# Patient Record
Sex: FEMALE | Race: WHITE | Employment: OTHER | URBAN - METROPOLITAN AREA
[De-identification: names, ages, dates, MRNs, and addresses within clinical notes are randomized per-mention and may not be internally consistent; named-entity substitution may affect disease eponyms.]

---

## 2019-01-02 PROBLEM — Z85.42 H/O MALIGNANT NEOPLASM OF ENDOMETRIUM: Status: ACTIVE | Noted: 2019-01-02

## 2019-01-03 ENCOUNTER — HOSPITAL ENCOUNTER (OUTPATIENT)
Dept: GENERAL RADIOLOGY | Age: 74
Discharge: HOME | End: 2019-01-05
Payer: MEDICARE

## 2019-01-03 DIAGNOSIS — Z85.3 PERSONAL HISTORY OF MALIGNANT NEOPLASM OF BREAST: ICD-10-CM

## 2019-01-03 DIAGNOSIS — Z12.31 SCREENING MAMMOGRAM, ENCOUNTER FOR: ICD-10-CM

## 2019-01-03 DIAGNOSIS — Z01.419 WELL FEMALE EXAM WITH ROUTINE GYNECOLOGICAL EXAM: ICD-10-CM

## 2019-01-03 DIAGNOSIS — Z12.11 SCREEN FOR COLON CANCER: ICD-10-CM

## 2019-01-03 PROCEDURE — 77067 SCR MAMMO BI INCL CAD: CPT

## 2019-01-10 ENCOUNTER — TELEPHONE (OUTPATIENT)
Dept: ONCOLOGY | Age: 74
End: 2019-01-10

## 2019-01-27 PROBLEM — Z85.3 PERSONAL HISTORY OF MALIGNANT NEOPLASM OF BREAST: Status: ACTIVE | Noted: 2019-01-27

## 2019-08-21 ENCOUNTER — HOSPITAL ENCOUNTER (EMERGENCY)
Age: 74
Discharge: HOME | End: 2019-08-21
Payer: MEDICARE

## 2019-08-21 ENCOUNTER — APPOINTMENT (OUTPATIENT)
Dept: CT IMAGING | Age: 74
End: 2019-08-21
Payer: MEDICARE

## 2019-08-21 VITALS
RESPIRATION RATE: 16 BRPM | WEIGHT: 105 LBS | HEART RATE: 81 BPM | DIASTOLIC BLOOD PRESSURE: 83 MMHG | SYSTOLIC BLOOD PRESSURE: 176 MMHG | TEMPERATURE: 98.4 F | OXYGEN SATURATION: 99 % | BODY MASS INDEX: 22.04 KG/M2 | HEIGHT: 58 IN

## 2019-08-21 DIAGNOSIS — S16.1XXA ACUTE STRAIN OF NECK MUSCLE, INITIAL ENCOUNTER: ICD-10-CM

## 2019-08-21 DIAGNOSIS — S01.01XA LACERATION OF SCALP, INITIAL ENCOUNTER: ICD-10-CM

## 2019-08-21 DIAGNOSIS — S00.03XA HEMATOMA OF SCALP, INITIAL ENCOUNTER: ICD-10-CM

## 2019-08-21 DIAGNOSIS — S09.90XA CLOSED HEAD INJURY, INITIAL ENCOUNTER: Primary | ICD-10-CM
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
## 2019-08-21 PROCEDURE — 99283 EMERGENCY DEPT VISIT LOW MDM: CPT

## 2019-08-21 PROCEDURE — 72125 CT NECK SPINE W/O DYE: CPT

## 2019-08-21 PROCEDURE — 70450 CT HEAD/BRAIN W/O DYE: CPT

## 2019-08-21 RX ORDER — GINSENG 100 MG
CAPSULE ORAL ONCE
Status: DISCONTINUED | OUTPATIENT
Start: 2019-08-21 | End: 2019-08-21 | Stop reason: HOSPADM

## 2019-08-21 ASSESSMENT — PAIN SCALES - GENERAL: PAINLEVEL_OUTOF10: 8

## 2019-08-23 ENCOUNTER — CARE COORDINATION (OUTPATIENT)
Dept: CARE COORDINATION | Age: 74
End: 2019-08-23

## 2020-01-02 ENCOUNTER — OFFICE VISIT (OUTPATIENT)
Dept: NEUROLOGY | Age: 75
End: 2020-01-02
Payer: MEDICARE

## 2020-01-02 VITALS
DIASTOLIC BLOOD PRESSURE: 65 MMHG | TEMPERATURE: 97 F | HEIGHT: 58 IN | BODY MASS INDEX: 23.3 KG/M2 | SYSTOLIC BLOOD PRESSURE: 113 MMHG | HEART RATE: 67 BPM | WEIGHT: 111 LBS | RESPIRATION RATE: 18 BRPM

## 2020-01-02 PROCEDURE — 99204 OFFICE O/P NEW MOD 45 MIN: CPT | Performed by: PSYCHIATRY & NEUROLOGY

## 2020-01-02 PROCEDURE — G8420 CALC BMI NORM PARAMETERS: HCPCS | Performed by: PSYCHIATRY & NEUROLOGY

## 2020-01-02 PROCEDURE — 2022F DILAT RTA XM EVC RTNOPTHY: CPT | Performed by: PSYCHIATRY & NEUROLOGY

## 2020-01-02 PROCEDURE — G8484 FLU IMMUNIZE NO ADMIN: HCPCS | Performed by: PSYCHIATRY & NEUROLOGY

## 2020-01-02 PROCEDURE — G8427 DOCREV CUR MEDS BY ELIG CLIN: HCPCS | Performed by: PSYCHIATRY & NEUROLOGY

## 2020-01-02 PROCEDURE — 1090F PRES/ABSN URINE INCON ASSESS: CPT | Performed by: PSYCHIATRY & NEUROLOGY

## 2020-01-02 ASSESSMENT — ENCOUNTER SYMPTOMS
TROUBLE SWALLOWING: 0
SHORTNESS OF BREATH: 0
NAUSEA: 0
PHOTOPHOBIA: 0
VOMITING: 0

## 2020-01-02 NOTE — PROGRESS NOTES
the left internal capsule. CT cervical spine: Diffuse degenerative changes from C3-T1    Carotid Doppler: 2018: Marked tortuosity of the left proximal ICA    I have personally reviewed the lab results:    Vitamin D: 36  WBC: 7  RBC: 3.8    LDL: 87    I have personally reviewed the outside records.     PAST MEDICAL HISTORY:   Past Medical History:   Diagnosis Date    Anxiety     Asthma     Heart murmur     Hypertension     Meniere's disease     Rheumatic fever     Vasovagal near syncope 2012    Vitamin D deficiency      PAST SURGICAL HISTORY:   Past Surgical History:   Procedure Laterality Date    APPENDECTOMY      BREAST BIOPSY      2 bx negative    CATARACT REMOVAL      rt 5/5/10 lt6/9/10    HYSTERECTOMY  2001    emmanuel and bso    TONSILLECTOMY       FAMILY MEDICAL HISTORY:   Family History   Problem Relation Age of Onset    Breast Cancer Mother     Stroke Sister      SOCIAL HISTORY:   Social History     Socioeconomic History    Marital status:      Spouse name: None    Number of children: None    Years of education: None    Highest education level: None   Occupational History    None   Social Needs    Financial resource strain: None    Food insecurity:     Worry: None     Inability: None    Transportation needs:     Medical: None     Non-medical: None   Tobacco Use    Smoking status: Former Smoker     Packs/day: 1.00     Years: 5.00     Pack years: 5.00     Last attempt to quit: 1962     Years since quittin.0    Smokeless tobacco: Never Used   Substance and Sexual Activity    Alcohol use: No    Drug use: No    Sexual activity: Yes     Partners: Male   Lifestyle    Physical activity:     Days per week: None     Minutes per session: None    Stress: None   Relationships    Social connections:     Talks on phone: None     Gets together: None     Attends Yarsanism service: None     Active member of club or organization: None     Attends meetings of clubs or organizations: None     Relationship status: None    Intimate partner violence:     Fear of current or ex partner: None     Emotionally abused: None     Physically abused: None     Forced sexual activity: None   Other Topics Concern    None   Social History Narrative    None     Allergy:   Allergies   Allergen Reactions    Dye [Iodides] Hives    Molds & Smuts Other (See Comments)     coughing    Naprosyn [Naproxen] Other (See Comments)     diplopia  Stomach reaction    Nsaids     Salicylates     Seasonale [Levonorgest-Eth Estrad 91-Day] Other (See Comments)     sneszing    Shellfish-Derived Products Hives     MEDS:   Current Outpatient Medications:     sertraline (ZOLOFT) 50 MG tablet, Take 1 tablet by mouth daily, Disp: 30 tablet, Rfl: 11    dicyclomine (BENTYL) 20 MG tablet, Take 1 tablet by mouth 4 times daily as needed (spasm), Disp: 60 tablet, Rfl: 5    meclizine (ANTIVERT) 25 MG tablet, Take 1 tablet by mouth 3 times daily as needed for Dizziness, Disp: 90 tablet, Rfl: 2    simvastatin (ZOCOR) 20 MG tablet, TAKE ONE TABLET BY MOUTH DAILY, Disp: 60 tablet, Rfl: 1    olmesartan (BENICAR) 5 MG tablet, Take 2 tablets by mouth daily, Disp: 180 tablet, Rfl: 3    REVIEW OF SYSTEMS  Review of Systems   Constitutional: Negative for appetite change, fatigue and unexpected weight change. HENT: Negative for drooling, hearing loss, tinnitus and trouble swallowing. Eyes: Negative for photophobia and visual disturbance. Respiratory: Negative for shortness of breath. Cardiovascular: Negative for palpitations. Gastrointestinal: Negative for nausea and vomiting. Endocrine: Negative for polyuria. Genitourinary: Negative for flank pain. Musculoskeletal: Positive for gait problem, neck pain and neck stiffness. Skin: Negative for rash. Allergic/Immunologic: Negative for food allergies.    Neurological: Negative for dizziness, tremors, seizures, syncope, speech difficulty, weakness, light-headedness, numbness and headaches. Hematological: Negative for adenopathy. Psychiatric/Behavioral: Negative for agitation, behavioral problems and sleep disturbance. PHYSICAL EXAM:   /65 (Site: Left Upper Arm, Position: Sitting, Cuff Size: Medium Adult)   Pulse 67   Temp 97 °F (36.1 °C) (Oral)   Resp 18   Ht 4' 10\" (1.473 m)   Wt 111 lb (50.3 kg)   BMI 23.20 kg/m²   GENERAL APPEARANCE: Alert, well-developed, well-nourished female in no acute distress. HEENT: Normocephalic and atraumatic. PERRL. Oropharynx unremarkable. PULM: Normal respiratory effort. No accessory muscle use. CV: RRR. ABDOMEN: Soft, nontender. EXTREMITIES: No obvious signs of vascular compromise. Pulses present. No cyanosis, clubbing or edema. SKIN: Clear; no rashes, lesions or skin breaks in exposed areas. NEURO:     Neurological examination     MENTAL STATUS: Patient awake and oriented to time, place, and person. Recent/remote memory normal. Attention span/concentration normal. Speech fluent. Good comprehension, naming, and repetition. Fund of knowledge appropriate for patient's level of education. Affect normal.    CRANIAL NERVES:  CN I: Not tested. CN II: Fundoscopic exam not performed. CN III, IV, VI: Pupils equal, round and reactive to light; extra ocular movements full and intact. CN V: Facial sensation normal.  CN VII: No facial asymmetry. CN VIII:  Hearing grossly normal bilaterally. No pathologic nystagmus or skew deviation. CN IX, X: Palate elevates symmetrically. CN XI: Shoulder shrug and chin rotation equal with intact strength. CN XII: Tongue protrusion midline. MOTOR: Normal bulk. Tone normal and symmetric throughout. Strength 5/5 throughout. ABNORMAL MOVEMENTS/TREMORS: No     REFLEXES: DTRs 3+; normal and symmetric throughout. Plantar response downgoing. SENSATION: Sensation grossly intact to fine touch, pain/temperature, vibration and position.     COORDINATION: Finger-to-nose and heel to shin normal for age and symmetric. Finger tapping and alternating movements normal.    STATION: Negative Romberg. GAIT:  Normal heel, toe and tandem; no ataxia. DIAGNOSTIC TESTS:   Sodium   Date Value Ref Range Status   11/13/2018 134 mmol/L Final   04/24/2018 139 mmol/L Final   10/18/2017 135 mmol/L Final     Potassium   Date Value Ref Range Status   04/24/2018 4.8 mmol/L Final   09/21/2017 4.7 3.5 - 5.0 mmol/L Final   09/20/2017 4.9 3.5 - 5.0 mmol/L Final     Chloride   Date Value Ref Range Status   09/21/2017 94 (L) 98 - 107 mmol/L Final   09/20/2017 91 (L) 98 - 107 mmol/L Final   05/01/2017 97 (L) 98 - 107 mmol/L Final     CO2   Date Value Ref Range Status   09/21/2017 27 22 - 29 mmol/L Final   09/20/2017 28 22 - 29 mmol/L Final   05/01/2017 25 22 - 29 mmol/L Final     BUN   Date Value Ref Range Status   04/24/2018 20 mg/dL Final   09/21/2017 24 (H) 8 - 23 mg/dL Final   09/20/2017 24 (H) 8 - 23 mg/dL Final     CREATININE   Date Value Ref Range Status   04/24/2018 1.10  Final   09/21/2017 1.0 0.5 - 1.0 mg/dL Final   09/20/2017 1.0 0.5 - 1.0 mg/dL Final     GFR Non-   Date Value Ref Range Status   09/21/2017 54 >=60 mL/min/1.73 Final     Comment:     Chronic Kidney Disease: less than 60 ml/min/1.73 sq.m. Kidney Failure: less than 15 ml/min/1.73 sq.m. Results valid for patients 18 years and older. 09/20/2017 54 >=60 mL/min/1.73 Final     Comment:     Chronic Kidney Disease: less than 60 ml/min/1.73 sq.m. Kidney Failure: less than 15 ml/min/1.73 sq.m. Results valid for patients 18 years and older. 05/01/2017 54 >=60 mL/min/1.73 Final     Comment:     Chronic Kidney Disease: less than 60 ml/min/1.73 sq.m. Kidney Failure: less than 15 ml/min/1.73 sq.m. Results valid for patients 18 years and older.        Gfr Calculated   Date Value Ref Range Status   05/14/2019 51.43  Final   04/24/2018 51.58  Final     Calcium   Date Value Ref Range Status   09/21/2017 9.3 8.6 - 10.2 mg/dL Final   09/20/2017 10.3 (H) 8.6 - 10.2 mg/dL Final   05/01/2017 9.3 8.6 - 10.2 mg/dL Final     Magnesium   Date Value Ref Range Status   09/21/2017 1.9 1.6 - 2.6 mg/dL Final     WBC   Date Value Ref Range Status   09/21/2017 6.4 4.5 - 11.5 E9/L Final   09/20/2017 6.1 4.5 - 11.5 E9/L Final   05/01/2017 8.3 4.5 - 11.5 E9/L Final     Hemoglobin   Date Value Ref Range Status   09/21/2017 12.6 11.5 - 15.5 g/dL Final   09/20/2017 13.7 11.5 - 15.5 g/dL Final   05/01/2017 12.9 11.5 - 15.5 g/dL Final     Hematocrit   Date Value Ref Range Status   09/21/2017 37.6 34.0 - 48.0 % Final   09/20/2017 40.3 34.0 - 48.0 % Final   05/01/2017 37.5 34.0 - 48.0 % Final     Platelets   Date Value Ref Range Status   09/21/2017 236 130 - 450 E9/L Final   09/20/2017 251 130 - 450 E9/L Final   05/01/2017 236 130 - 450 E9/L Final     Neutrophils %   Date Value Ref Range Status   09/21/2017 51.2 43.0 - 80.0 % Final   05/01/2017 76.0 43.0 - 80.0 % Final     Monocytes %   Date Value Ref Range Status   09/21/2017 13.1 (H) 2.0 - 12.0 % Final   05/01/2017 7.2 2.0 - 12.0 % Final     Monocytes   Date Value Ref Range Status   03/23/2011 11 2 - 12 % Final     Total Protein   Date Value Ref Range Status   01/19/2015 6.9 6.4 - 8.3 g/dL Final   12/16/2014 7.1 6.4 - 8.3 g/dL Final   03/23/2011 6.8 5.7 - 8.2 g/dL Final     Total Bilirubin   Date Value Ref Range Status   04/24/2018 0.5 0.1 - 1.4 mg/dL Final   01/19/2015 0.4 0.0 - 1.2 mg/dL Final   12/16/2014 0.3 0.0 - 1.2 mg/dL Final     Alkaline Phosphatase   Date Value Ref Range Status   05/14/2019 148 U/L Final   11/13/2018 149 U/L Final   04/24/2018 129 U/L Final     ALT   Date Value Ref Range Status   04/24/2018 21 U/L Final   01/19/2015 22 0 - 32 U/L Final   12/16/2014 23 0 - 32 U/L Final     AST   Date Value Ref Range Status   04/24/2018 25 U/L Final   01/19/2015 30 0 - 31 U/L Final   12/16/2014 28 0 - 31 U/L Final     Lab Results   Component Value Date

## 2020-01-03 ENCOUNTER — TELEPHONE (OUTPATIENT)
Dept: NEUROLOGY | Age: 75
End: 2020-01-03

## 2020-01-03 ENCOUNTER — HOSPITAL ENCOUNTER (OUTPATIENT)
Age: 75
Discharge: HOME | End: 2020-01-03
Payer: MEDICARE

## 2020-01-03 LAB — HBA1C MFR BLD: 5.9 % (ref 4–5.6)

## 2020-01-03 PROCEDURE — 36415 COLL VENOUS BLD VENIPUNCTURE: CPT

## 2020-01-03 PROCEDURE — 83036 HEMOGLOBIN GLYCOSYLATED A1C: CPT

## 2020-01-07 ENCOUNTER — HOSPITAL ENCOUNTER (OUTPATIENT)
Dept: MRI IMAGING | Age: 75
Discharge: HOME | End: 2020-01-09
Payer: MEDICARE

## 2020-01-07 ENCOUNTER — TELEPHONE (OUTPATIENT)
Dept: NEUROLOGY | Age: 75
End: 2020-01-07

## 2020-01-07 PROCEDURE — 70551 MRI BRAIN STEM W/O DYE: CPT

## 2020-01-07 PROCEDURE — 70544 MR ANGIOGRAPHY HEAD W/O DYE: CPT

## 2020-01-07 PROCEDURE — 72141 MRI NECK SPINE W/O DYE: CPT

## 2020-01-07 NOTE — TELEPHONE ENCOUNTER
Notified pt that her A1C was 5.9. This can be controlled with diet and exercise. She was instructed to follow up with her PCP. Pt verbalized understanding.

## 2020-01-07 NOTE — TELEPHONE ENCOUNTER
----- Message from Tiffanie Gan MD sent at 1/6/2020  4:40 PM EST -----  Hemoglobin A1c: 5.9. Controlled with diet and exercise  Follow-up with primary care physician.

## 2020-01-08 ENCOUNTER — TELEPHONE (OUTPATIENT)
Dept: NEUROLOGY | Age: 75
End: 2020-01-08

## 2020-01-08 NOTE — TELEPHONE ENCOUNTER
Patient notified :    MRI cervical spine: She has arthritis and degenerative disc at multiple levels  Most notable at     Central spinal canal stenosis is present at C5-6. Neural foraminal canal stenosis is present on the left C2-3,  bilaterally at C3-4, C4-5, C5-6 and C6-7. She will benefit from physical therapy if not already done. Please inquire. Patient states is going to vestibular therapy at [facility] and would like to go to Physical therapy.

## 2020-01-08 NOTE — TELEPHONE ENCOUNTER
----- Message from Raya Govea MD sent at 1/8/2020  1:49 PM EST -----  Please inform the patient. MRI cervical spine: She has arthritis and degenerative disc at multiple levels  Most notable at     Central spinal canal stenosis is present at C5-6. Neural foraminal canal stenosis is present on the left C2-3,  bilaterally at C3-4, C4-5, C5-6 and C6-7. She will benefit from physical therapy if not already done.   Please inquire

## 2020-01-14 ENCOUNTER — HOSPITAL ENCOUNTER (OUTPATIENT)
Dept: GENERAL RADIOLOGY | Age: 75
Discharge: HOME | End: 2020-01-16
Payer: MEDICARE

## 2020-01-14 ENCOUNTER — APPOINTMENT (OUTPATIENT)
Dept: GENERAL RADIOLOGY | Age: 75
End: 2020-01-14
Payer: MEDICARE

## 2020-01-14 PROCEDURE — G0279 TOMOSYNTHESIS, MAMMO: HCPCS

## 2020-01-14 PROCEDURE — 76642 ULTRASOUND BREAST LIMITED: CPT

## 2020-01-14 PROCEDURE — 77063 BREAST TOMOSYNTHESIS BI: CPT

## 2020-01-30 ENCOUNTER — TELEPHONE (OUTPATIENT)
Dept: NEUROLOGY | Age: 75
End: 2020-01-30

## 2020-01-30 ENCOUNTER — OFFICE VISIT (OUTPATIENT)
Dept: NEUROLOGY | Age: 75
End: 2020-01-30
Payer: MEDICARE

## 2020-01-30 VITALS
BODY MASS INDEX: 23.51 KG/M2 | DIASTOLIC BLOOD PRESSURE: 55 MMHG | HEART RATE: 68 BPM | HEIGHT: 57 IN | SYSTOLIC BLOOD PRESSURE: 101 MMHG | TEMPERATURE: 97.2 F | WEIGHT: 109 LBS | OXYGEN SATURATION: 99 % | RESPIRATION RATE: 18 BRPM

## 2020-01-30 PROCEDURE — 1036F TOBACCO NON-USER: CPT | Performed by: PSYCHIATRY & NEUROLOGY

## 2020-01-30 PROCEDURE — 1123F ACP DISCUSS/DSCN MKR DOCD: CPT | Performed by: PSYCHIATRY & NEUROLOGY

## 2020-01-30 PROCEDURE — G8484 FLU IMMUNIZE NO ADMIN: HCPCS | Performed by: PSYCHIATRY & NEUROLOGY

## 2020-01-30 PROCEDURE — G8420 CALC BMI NORM PARAMETERS: HCPCS | Performed by: PSYCHIATRY & NEUROLOGY

## 2020-01-30 PROCEDURE — 99215 OFFICE O/P EST HI 40 MIN: CPT | Performed by: PSYCHIATRY & NEUROLOGY

## 2020-01-30 PROCEDURE — 3017F COLORECTAL CA SCREEN DOC REV: CPT | Performed by: PSYCHIATRY & NEUROLOGY

## 2020-01-30 PROCEDURE — G8427 DOCREV CUR MEDS BY ELIG CLIN: HCPCS | Performed by: PSYCHIATRY & NEUROLOGY

## 2020-01-30 PROCEDURE — 1090F PRES/ABSN URINE INCON ASSESS: CPT | Performed by: PSYCHIATRY & NEUROLOGY

## 2020-01-30 PROCEDURE — 4040F PNEUMOC VAC/ADMIN/RCVD: CPT | Performed by: PSYCHIATRY & NEUROLOGY

## 2020-01-30 PROCEDURE — G8399 PT W/DXA RESULTS DOCUMENT: HCPCS | Performed by: PSYCHIATRY & NEUROLOGY

## 2020-01-30 ASSESSMENT — ENCOUNTER SYMPTOMS
TROUBLE SWALLOWING: 0
VOMITING: 0
SHORTNESS OF BREATH: 0
PHOTOPHOBIA: 0
NAUSEA: 0

## 2020-01-30 NOTE — TELEPHONE ENCOUNTER
Left message on patients answering machine that on today's visit Dr Sofya Lozoya  reviewed and discussed the MRI brain and cervical spine results in detail with the patient. She has stenosis of the cervical spine which could be attributing to her dizziness along with the Ménière's. The brain MRI showed an  old stroke and white spots.   The white spots indicate damage to the brain from underlying high blood pressure, smoking etc. which can cause memory loss in long-term

## 2020-01-30 NOTE — PROGRESS NOTES
NEUROLOGY FOLLOW UP CONSULTATION NOTE     Date: 1/30/2020  Name: Suzanne Chacon  MRN: [id number]  Patient's PCP: Migue Benitez MD     Dear, Dr. Migue Benitez MD    REASON FOR VISIT: Follow-up for disequilibrium, dizziness and lacunar infarct. Interim history:    The patient is coming in for follow-up visit. She reports that her dizziness has significantly improved after she started doing physical therapy. Currently she is doing vestibular therapy as well. However she does report that she feels that the dizziness is starting to come on since today morning. MRI brain: Significant small vessel ischemic disease with old left basal ganglia infarct  MRA head and neck: No acute abnormality  MRI cervical spine::Central spinal canal stenosis is present at C5-6. Neural foraminal canal stenosis is present on the left C2-3,  bilaterally at C3-4, C4-5, C5-6 and C6-7. Dizziness gets worse whenever she moves her legs. No other aggravating relieving factors  No other associated symptoms    I have personally reviewed the images of MRI brain and cervical spine. I have personally reviewed the labs    Hemoglobin A1c: 5.9  LDL: 87  Vitamin D level: 36    Disease course:    Suzanne Chacon is a 75 y.o.  female with a past medical history of hypertension, mitral valve prolapse, degenerative cervical disc disease, and Ménière's disease currently on a low-salt diet. Dizziness    · Describes as: Unsteadiness, unsteady on her feet, feels like she will fall onto the ground. · Onset: 1 year ago  · Duration: 10 to 15 minutes  · Severity: Mild to moderate  · Pattern : Intermittent   · Context:  When eating breakfast after which she feels as if she will collapse to the ground, occurs intermittently. Resolve on its   own. She had an episode while driving about 7 to 8 months ago where she   was   drifting from one osiris to the other and then suddenly realized that she was doing that.   · Aggravating factors: None  · use: No    Drug use: No    Sexual activity: Yes     Partners: Male   Lifestyle    Physical activity:     Days per week: None     Minutes per session: None    Stress: None   Relationships    Social connections:     Talks on phone: None     Gets together: None     Attends Uatsdin service: None     Active member of club or organization: None     Attends meetings of clubs or organizations: None     Relationship status: None    Intimate partner violence:     Fear of current or ex partner: None     Emotionally abused: None     Physically abused: None     Forced sexual activity: None   Other Topics Concern    None   Social History Narrative    None     Allergy:   Allergies   Allergen Reactions    Dye [Iodides] Hives    Molds & Smuts Other (See Comments)     coughing    Naprosyn [Naproxen] Other (See Comments)     diplopia  Stomach reaction    Nsaids     Salicylates     Seasonale [Levonorgest-Eth Estrad 91-Day] Other (See Comments)     sneszing    Shellfish-Derived Products Hives     MEDS:   Current Outpatient Medications:     olmesartan (BENICAR) 5 MG tablet, TAKE 2 TABLETS BY MOUTH DAILY, Disp: 180 tablet, Rfl: 3    sertraline (ZOLOFT) 50 MG tablet, Take 1 tablet by mouth daily, Disp: 30 tablet, Rfl: 11    dicyclomine (BENTYL) 20 MG tablet, Take 1 tablet by mouth 4 times daily as needed (spasm) (Patient taking differently: Take 20 mg by mouth 1/2 daily), Disp: 60 tablet, Rfl: 5    meclizine (ANTIVERT) 25 MG tablet, Take 1 tablet by mouth 3 times daily as needed for Dizziness (Patient taking differently: Take 25 mg by mouth 1 tablet twice daily), Disp: 90 tablet, Rfl: 2    simvastatin (ZOCOR) 20 MG tablet, TAKE ONE TABLET BY MOUTH DAILY, Disp: 60 tablet, Rfl: 1    REVIEW OF SYSTEMS  Review of Systems   Constitutional: Negative for appetite change, fatigue and unexpected weight change. HENT: Negative for drooling, hearing loss, tinnitus and trouble swallowing.     Eyes: Negative for photophobia and grossly normal bilaterally. No pathologic nystagmus or skew deviation. CN IX, X: Palate elevates symmetrically. CN XI: Shoulder shrug and chin rotation equal with intact strength. CN XII: Tongue protrusion midline. MOTOR: Normal bulk. Tone normal and symmetric throughout. Strength 5/5 throughout. ABNORMAL MOVEMENTS/TREMORS: No     REFLEXES: DTRs 3+; normal and symmetric throughout. Plantar response downgoing. SENSATION: Sensation grossly intact to fine touch, pain/temperature, vibration and position. COORDINATION: Finger-to-nose and heel to shin normal for age and symmetric. Finger tapping and alternating movements normal.    STATION: Negative Romberg. GAIT:  Normal heel, toe and tandem; no ataxia. DIAGNOSTIC TESTS:   Sodium   Date Value Ref Range Status   11/13/2018 134 mmol/L Final   04/24/2018 139 mmol/L Final   10/18/2017 135 mmol/L Final     Potassium   Date Value Ref Range Status   04/24/2018 4.8 mmol/L Final   09/21/2017 4.7 3.5 - 5.0 mmol/L Final   09/20/2017 4.9 3.5 - 5.0 mmol/L Final     Chloride   Date Value Ref Range Status   09/21/2017 94 (L) 98 - 107 mmol/L Final   09/20/2017 91 (L) 98 - 107 mmol/L Final   05/01/2017 97 (L) 98 - 107 mmol/L Final     CO2   Date Value Ref Range Status   09/21/2017 27 22 - 29 mmol/L Final   09/20/2017 28 22 - 29 mmol/L Final   05/01/2017 25 22 - 29 mmol/L Final     BUN   Date Value Ref Range Status   04/24/2018 20 mg/dL Final   09/21/2017 24 (H) 8 - 23 mg/dL Final   09/20/2017 24 (H) 8 - 23 mg/dL Final     CREATININE   Date Value Ref Range Status   04/24/2018 1.10  Final   09/21/2017 1.0 0.5 - 1.0 mg/dL Final   09/20/2017 1.0 0.5 - 1.0 mg/dL Final     GFR Non-   Date Value Ref Range Status   09/21/2017 54 >=60 mL/min/1.73 Final     Comment:     Chronic Kidney Disease: less than 60 ml/min/1.73 sq.m. Kidney Failure: less than 15 ml/min/1.73 sq.m. Results valid for patients 18 years and older.      09/20/2017 54 >=60 mL/min/1.73 Final     Comment:     Chronic Kidney Disease: less than 60 ml/min/1.73 sq.m. Kidney Failure: less than 15 ml/min/1.73 sq.m. Results valid for patients 18 years and older. 05/01/2017 54 >=60 mL/min/1.73 Final     Comment:     Chronic Kidney Disease: less than 60 ml/min/1.73 sq.m. Kidney Failure: less than 15 ml/min/1.73 sq.m. Results valid for patients 18 years and older.        Gfr Calculated   Date Value Ref Range Status   05/14/2019 51.43  Final   04/24/2018 51.58  Final     Calcium   Date Value Ref Range Status   09/21/2017 9.3 8.6 - 10.2 mg/dL Final   09/20/2017 10.3 (H) 8.6 - 10.2 mg/dL Final   05/01/2017 9.3 8.6 - 10.2 mg/dL Final     Magnesium   Date Value Ref Range Status   09/21/2017 1.9 1.6 - 2.6 mg/dL Final     WBC   Date Value Ref Range Status   09/21/2017 6.4 4.5 - 11.5 E9/L Final   09/20/2017 6.1 4.5 - 11.5 E9/L Final   05/01/2017 8.3 4.5 - 11.5 E9/L Final     Hemoglobin   Date Value Ref Range Status   09/21/2017 12.6 11.5 - 15.5 g/dL Final   09/20/2017 13.7 11.5 - 15.5 g/dL Final   05/01/2017 12.9 11.5 - 15.5 g/dL Final     Hematocrit   Date Value Ref Range Status   09/21/2017 37.6 34.0 - 48.0 % Final   09/20/2017 40.3 34.0 - 48.0 % Final   05/01/2017 37.5 34.0 - 48.0 % Final     Platelets   Date Value Ref Range Status   09/21/2017 236 130 - 450 E9/L Final   09/20/2017 251 130 - 450 E9/L Final   05/01/2017 236 130 - 450 E9/L Final     Neutrophils %   Date Value Ref Range Status   09/21/2017 51.2 43.0 - 80.0 % Final   05/01/2017 76.0 43.0 - 80.0 % Final     Monocytes %   Date Value Ref Range Status   09/21/2017 13.1 (H) 2.0 - 12.0 % Final   05/01/2017 7.2 2.0 - 12.0 % Final     Monocytes   Date Value Ref Range Status   03/23/2011 11 2 - 12 % Final     Total Protein   Date Value Ref Range Status   01/19/2015 6.9 6.4 - 8.3 g/dL Final   12/16/2014 7.1 6.4 - 8.3 g/dL Final   03/23/2011 6.8 5.7 - 8.2 g/dL Final     Total Bilirubin   Date Value Ref Range Status 04/24/2018 0.5 0.1 - 1.4 mg/dL Final   01/19/2015 0.4 0.0 - 1.2 mg/dL Final   12/16/2014 0.3 0.0 - 1.2 mg/dL Final     Alkaline Phosphatase   Date Value Ref Range Status   05/14/2019 148 U/L Final   11/13/2018 149 U/L Final   04/24/2018 129 U/L Final     ALT   Date Value Ref Range Status   04/24/2018 21 U/L Final   01/19/2015 22 0 - 32 U/L Final   12/16/2014 23 0 - 32 U/L Final     AST   Date Value Ref Range Status   04/24/2018 25 U/L Final   01/19/2015 30 0 - 31 U/L Final   12/16/2014 28 0 - 31 U/L Final     Lab Results   Component Value Date    INR 1.1 09/23/2012     Lab Results   Component Value Date    TRIG 58 05/14/2019    HDL 72 (A) 05/14/2019     No components found for: HGBA1C  No results found for: PROTEINCSF, GLUCCSF, WBCCSF    Controlled Substance Monitoring:    Acute and Chronic Pain Monitoring:   No flowsheet data found. MEDICAL DECISION MAKING  ASSESSMENT/PLAN    Kira Miller was seen today for dizziness. Diagnoses and all orders for this visit:    Dizziness  Most likely secondary to a combination of Ménière's disease and cervical spinal stenosis and cervical biomechanics. Currently she is on meclizine which helps her symptoms partially. Continue with physical therapy for cervicogenic dizziness  She will benefit from gabapentin and occipital nerve blocks for cervicogenic dizziness. I have discussed the options with the patient however the patient would like to think about this and get back to me. Cervical spinal stenosis  The patient is currently doing physical therapy  I have discussed the possibility of seeing a neurosurgeon however she does not want to see a neurosurgeon at this time. Hyperreflexia   Most likely secondary from underlying cervical spinal stenosis. Ménière's disease  She does have associated dizziness with the Ménière's disease. Currently on a low-salt diet  She follows up at [facility].     Left basal ganglia stroke    MRI brain: Significant small vessel disease in left basal ganglia lacunar stroke  Etiology of the stroke: Small vessel disease  LDL: 87, continue on simvastatin  Hemoglobin A1c: 5.9, lifestyle modification  Start aspirin 81 mg daily      Return if symptoms worsen or fail to improve. Today, I personally spent a great deal of time directly face-to-face with the patient, of which greater than 50% was spent in patient education, counseling,about etiology management diagnosis of cervical spinal stenosis, conditions that can cause dizziness, Ménière's disease, vertebrobasilar syndrome, cerebellar stroke, stroke risk factors and coordination of care as described above. Patient's current medication list, allergies, problem list and results of all previously ordered testing  were reviewed at today's visit.       MD SERA Sarmiento [facility] Neurology  [address] [place] [address]

## 2020-01-30 NOTE — TELEPHONE ENCOUNTER
On today's visit I had reviewed and discussed the MRI brain and cervical spine results in detail with the patient. She has stenosis of the cervical spine which could be attributing to her dizziness along with the Ménière's. The brain MRI shows old stroke and white spots.   The white spots indicate damage to the brain from underlying high blood pressure, smoking etc. which can cause memory loss in long-term

## 2020-11-24 DIAGNOSIS — Z20.828 EXPOSURE TO SARS-ASSOCIATED CORONAVIRUS: ICD-10-CM

## 2020-11-26 LAB
SARS-COV-2: NOT DETECTED
SOURCE: NORMAL

## 2021-02-04 ENCOUNTER — HOSPITAL ENCOUNTER (OUTPATIENT)
Dept: GENERAL RADIOLOGY | Age: 76
Discharge: HOME | End: 2021-02-06
Payer: MEDICARE

## 2021-02-04 DIAGNOSIS — Z01.419 WELL FEMALE EXAM WITH ROUTINE GYNECOLOGICAL EXAM: ICD-10-CM

## 2021-02-04 DIAGNOSIS — Z12.31 ENCOUNTER FOR SCREENING MAMMOGRAM FOR MALIGNANT NEOPLASM OF BREAST: ICD-10-CM

## 2021-02-04 PROCEDURE — 77063 BREAST TOMOSYNTHESIS BI: CPT

## 2021-03-18 ENCOUNTER — HOSPITAL ENCOUNTER (OUTPATIENT)
Dept: GENERAL RADIOLOGY | Age: 76
Discharge: HOME | End: 2021-03-20
Payer: MEDICARE

## 2021-03-18 DIAGNOSIS — R92.2 DENSE BREAST TISSUE ON MAMMOGRAM: ICD-10-CM

## 2021-03-18 PROCEDURE — 76641 ULTRASOUND BREAST COMPLETE: CPT

## 2021-05-24 PROBLEM — E11.9 TYPE 2 DIABETES MELLITUS WITHOUT COMPLICATION, WITHOUT LONG-TERM CURRENT USE OF INSULIN (HCC): Status: ACTIVE | Noted: 2021-05-24

## 2021-12-29 DIAGNOSIS — J02.9 ACUTE PHARYNGITIS, UNSPECIFIED ETIOLOGY: ICD-10-CM

## 2021-12-29 DIAGNOSIS — R05.9 COUGH: ICD-10-CM

## 2021-12-31 LAB
SARS-COV-2: NOT DETECTED
SOURCE: NORMAL

## 2022-03-22 ENCOUNTER — HOSPITAL ENCOUNTER (OUTPATIENT)
Dept: GENERAL RADIOLOGY | Age: 77
Discharge: HOME | End: 2022-03-24
Payer: MEDICARE

## 2022-03-22 DIAGNOSIS — R92.2 DENSE BREAST TISSUE ON MAMMOGRAM: ICD-10-CM

## 2022-03-22 DIAGNOSIS — Z12.31 ENCOUNTER FOR SCREENING MAMMOGRAM FOR MALIGNANT NEOPLASM OF BREAST: ICD-10-CM

## 2022-03-22 PROCEDURE — 77063 BREAST TOMOSYNTHESIS BI: CPT

## 2022-03-22 PROCEDURE — 76641 ULTRASOUND BREAST COMPLETE: CPT

## 2023-03-30 ENCOUNTER — HOSPITAL ENCOUNTER (OUTPATIENT)
Dept: GENERAL RADIOLOGY | Age: 78
Discharge: HOME | End: 2023-04-01
Payer: MEDICARE

## 2023-03-30 VITALS — BODY MASS INDEX: 23.3 KG/M2 | WEIGHT: 108 LBS | HEIGHT: 57 IN

## 2023-03-30 DIAGNOSIS — Z12.31 ENCOUNTER FOR SCREENING MAMMOGRAM FOR MALIGNANT NEOPLASM OF BREAST: ICD-10-CM

## 2023-03-30 PROCEDURE — 77063 BREAST TOMOSYNTHESIS BI: CPT

## 2024-02-27 ENCOUNTER — OFFICE VISIT (OUTPATIENT)
Dept: OTOLARYNGOLOGY | Facility: CLINIC | Age: 79
End: 2024-02-27
Payer: MEDICARE

## 2024-02-27 VITALS — BODY MASS INDEX: 22.57 KG/M2 | HEIGHT: 58 IN

## 2024-02-27 DIAGNOSIS — H81.03 MENIERE'S DISEASE OF BOTH EARS: ICD-10-CM

## 2024-02-27 DIAGNOSIS — H90.3 BILATERAL SENSORINEURAL HEARING LOSS: Primary | ICD-10-CM

## 2024-02-27 PROCEDURE — 1159F MED LIST DOCD IN RCRD: CPT | Performed by: OTOLARYNGOLOGY

## 2024-02-27 PROCEDURE — 1160F RVW MEDS BY RX/DR IN RCRD: CPT | Performed by: OTOLARYNGOLOGY

## 2024-02-27 PROCEDURE — 99213 OFFICE O/P EST LOW 20 MIN: CPT | Performed by: OTOLARYNGOLOGY

## 2024-02-27 RX ORDER — ASPIRIN 81 MG/1
81 TABLET ORAL DAILY
COMMUNITY

## 2024-02-27 RX ORDER — MECLIZINE HYDROCHLORIDE 25 MG/1
1 TABLET ORAL DAILY
COMMUNITY
Start: 2018-04-19

## 2024-02-27 RX ORDER — DICYCLOMINE HYDROCHLORIDE 20 MG/1
TABLET ORAL
COMMUNITY

## 2024-02-27 ASSESSMENT — PATIENT HEALTH QUESTIONNAIRE - PHQ9
SUM OF ALL RESPONSES TO PHQ9 QUESTIONS 1 AND 2: 0
2. FEELING DOWN, DEPRESSED OR HOPELESS: NOT AT ALL
1. LITTLE INTEREST OR PLEASURE IN DOING THINGS: NOT AT ALL

## 2024-02-27 NOTE — PROGRESS NOTES
"History Of Present Illness:  Bettina Fraser is a 79 y.o. female with a history of Menieres disease and bilateral sensorineural hearing loss. She did have a recent bone density test and was told about betahistine, she has some interest in hearing more about it. She is having problems with her hearing aids, she was told she may need a new mold in the near future. She does occasionally hear beeping/clicking in her ear similar to the noise her hearing aid makes, it only occurs for moments at a time. She notes after a recent drive to and from [place] she had a sensation as if she was still moving when she returned home. She denies any recent vertigo episodes, she continues to maintain a low sodium diet.     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 She is having some mild dizziness, describes it as \"feeling foggy\". She flew to [place] recently and when she got off the plane, she still felt like she was on it, she had the same sensation when driving long distance. She did do quite a bit of yard work lately, she's unsure if that exacerbating her dizziness, she notes when bending down in the garden she felt like she was going to fall forward. She; not had having any Menieres episodes at this point.      She is experiencing vocal hoarseness regularly, she followed with an ENT in [place] and was diagnosed with presbylaryngis, he suggested followup for Botox injections. She's currently wearing prism eyeglasses for double vision, she has up and down double vision when removing her glasses.      She's had quite a bit of stress in her daily life with recent family deaths and her husbands dementia diagnosis, she does have a friend in a similar situation whom she talks to occasionally about this.     Surgical History:  She has a past surgical history that includes Appendectomy (10/31/2017); Tonsillectomy (10/31/2017); Hysterectomy " "(10/31/2017); and Cataract extraction (10/31/2017).     Allergies:  Patient has no known allergies.    Medications:   Current Outpatient Medications   Medication Instructions    aspirin 81 mg, oral, Daily    dicyclomine (Bentyl) 20 mg tablet take 1 tablet by mouth four times a day if needed for SPASMS    meclizine (Antivert) 25 mg tablet 1 tablet, oral, Daily      Review of Systems:   A comprehensive 10-point review of systems was obtained including constitutional, neurological, HEENT, pulmonary, cardiovascular, genito-urinary, and other pertinent systems and was negative except as noted in the HPI.      Physical Exam:  Constitutional   General appearance: Healthy-appearing, well-nourished, well groomed, in no acute distress.   Ability to communicate: Normal communication without aids, normal voice quality.       Head and face: Atraumatic with no masses, lesions, or scarring.   Facial strength: Normal strength and symmetry, no synkinesis or facial tic.     Ears  Otoscopic examination: Bilateral normal otoscopy of the tympanic membrane     Nose: Dorsum symmetric with no visible or palpable deformities.     Oral Cavity/Mouth  Lips, teeth, and gums: Normal lips, gums, and dentition.     Oropharynx: Mucosa moist, no lesions.     Neck: Symmetrical, trachea midline. No masses visible.     Neurological/Psychiatric  Cranial Nerve Examination: II - XII grossly intact.  Orientation to person, place, and time: Normal.  Mood and affect: Normal.     Skin: Normal without rashes or lesions.     Pulmonary  Respiratory effort: Chest expands symmetrically.     Cardiovascular: Good peripheral pulses  Peripheral vascular system: No varicosities, carotid pulse normal, no edema. No jugular venous distension.     Extremities: Appearance of extremities: Normal. Gait normal.     Last Recorded Vitals:  Height 1.473 m (4' 10\").     Assessment/Plan   79 y.o. female with a history of Menieres disease and bilateral sensorineural hearing loss. " She is having some problems with her hearing aid, she's likely getting a new mold in the future. She does occasionally have a beeping/clicking noise in her ears that sounds similar to her hearing aids, it happens for a few seconds then stops. She recently drove to and from [place] and noted a sensation when she got home as if she were still moving, this is a phenomenon called Mal de Debarquement. She denies any recent vertigo episodes, she continues to abide by her low sodium diet. We discussed betahistine, we could prescribe this in the future if she's interested in doing a trial of it.     - RTC in 9 month      Scribe Attestation:  By signing my name below, I, Stacy Person Scrluciano   attest that this documentation has been prepared under the direction and in the presence of Lucille Álvarez MD.    I have reviewed the documentation as scribed by Stacy Person and agree with the notes.   Lucille Álvarez MD

## 2024-02-27 NOTE — PATIENT INSTRUCTIONS
Welcome to Dr. Álvarez's clinic. We are here to assist you through your ENT care at [facility].  Dr. Álvarez is an Ear surgeon. This means that she specializes in taking care of patients with complex ear problems.     Dr. Álvarez's office number is [phone]. While you may see her at a satellite office, she has a team committed to help meet your healthcare needs at [facility]. This number is the most direct way to communicate with the office.     Marj is Dr. Álvarez's  and she answers the office phone from 8am-4pm Mon-Fri. She can help you with many general questions and information. Questions that she cannot answer will be directed to the appropriate staff. You may need to leave a message. In this case, someone from the team will call you back.     Mateo is Dr. Álvarez's primary nurse and can be reached by calling the office. Mateo is in clinic with Dr. Álvarez's on Mondays and Tuesdays. Non-urgent calls will be returned on non-clinic days typically Thursdays.     Sometimes, other team members will also be involved in your care. These people may include dieticians, social workers, speech therapists, audiologist, neurologist, and physical therapist. Dr. Álvarez will provide these referrals as needed. Please let her know if you would like to request a specific referral.     For your convenience, Dr. Álvarez sees patients at several [facility] locations including [facility] and [facility] at the main campus of [facility]. While we try to make your appointments as convenient as possible, occasionally a visit to another location may be necessary to provide the best care for you. We look forward to working with you to meet your healthcare goals.     Dr. Álvarez makes every effort to run on time for your appointments. Therefore, if you are more than 30 minutes late unrelated to a scan or another appointment such therapy or audio you will have to reschedule.

## 2024-04-01 ENCOUNTER — HOSPITAL ENCOUNTER (OUTPATIENT)
Dept: GENERAL RADIOLOGY | Age: 79
Discharge: HOME | End: 2024-04-03
Payer: MEDICARE

## 2024-04-01 VITALS — BODY MASS INDEX: 23.3 KG/M2 | HEIGHT: 57 IN | WEIGHT: 108 LBS

## 2024-04-01 DIAGNOSIS — Z12.31 VISIT FOR SCREENING MAMMOGRAM: ICD-10-CM

## 2024-04-01 PROCEDURE — 77063 BREAST TOMOSYNTHESIS BI: CPT

## 2024-12-03 ENCOUNTER — APPOINTMENT (OUTPATIENT)
Dept: OTOLARYNGOLOGY | Facility: CLINIC | Age: 79
End: 2024-12-03
Payer: MEDICARE

## 2025-03-04 ENCOUNTER — APPOINTMENT (OUTPATIENT)
Dept: OTOLARYNGOLOGY | Facility: CLINIC | Age: 80
End: 2025-03-04
Payer: MEDICARE

## 2025-03-04 ENCOUNTER — APPOINTMENT (OUTPATIENT)
Dept: AUDIOLOGY | Facility: CLINIC | Age: 80
End: 2025-03-04
Payer: MEDICARE

## 2025-05-20 ENCOUNTER — HOSPITAL ENCOUNTER (OUTPATIENT)
Dept: GENERAL RADIOLOGY | Age: 80
Discharge: HOME | End: 2025-05-22
Payer: MEDICARE

## 2025-05-20 VITALS — WEIGHT: 105 LBS | BODY MASS INDEX: 20.51 KG/M2

## 2025-05-20 DIAGNOSIS — Z12.31 SCREENING MAMMOGRAM, ENCOUNTER FOR: ICD-10-CM

## 2025-05-20 PROCEDURE — 77063 BREAST TOMOSYNTHESIS BI: CPT

## 2025-07-10 ENCOUNTER — TELEPHONE (OUTPATIENT)
Dept: OTOLARYNGOLOGY | Facility: HOSPITAL | Age: 80
End: 2025-07-10
Payer: MEDICARE

## 2025-08-19 ENCOUNTER — APPOINTMENT (OUTPATIENT)
Dept: OTOLARYNGOLOGY | Facility: CLINIC | Age: 80
End: 2025-08-19
Payer: MEDICARE

## 2025-08-19 VITALS — HEIGHT: 59 IN | BODY MASS INDEX: 21.17 KG/M2 | WEIGHT: 105 LBS

## 2025-08-19 DIAGNOSIS — H90.3 BILATERAL SENSORINEURAL HEARING LOSS: Primary | ICD-10-CM

## 2025-08-19 DIAGNOSIS — H81.03 MENIERE'S DISEASE OF BOTH EARS: ICD-10-CM

## 2025-08-19 PROCEDURE — 99214 OFFICE O/P EST MOD 30 MIN: CPT | Performed by: OTOLARYNGOLOGY

## 2025-08-19 PROCEDURE — 1159F MED LIST DOCD IN RCRD: CPT | Performed by: OTOLARYNGOLOGY

## 2025-08-19 PROCEDURE — 1160F RVW MEDS BY RX/DR IN RCRD: CPT | Performed by: OTOLARYNGOLOGY

## 2025-08-19 PROCEDURE — G2211 COMPLEX E/M VISIT ADD ON: HCPCS | Performed by: OTOLARYNGOLOGY

## 2025-08-19 ASSESSMENT — PATIENT HEALTH QUESTIONNAIRE - PHQ9
2. FEELING DOWN, DEPRESSED OR HOPELESS: SEVERAL DAYS
SUM OF ALL RESPONSES TO PHQ9 QUESTIONS 1 AND 2: 1
1. LITTLE INTEREST OR PLEASURE IN DOING THINGS: NOT AT ALL

## 2025-12-30 ENCOUNTER — APPOINTMENT (OUTPATIENT)
Dept: OTOLARYNGOLOGY | Facility: CLINIC | Age: 80
End: 2025-12-30
Payer: MEDICARE

## 2026-08-18 ENCOUNTER — APPOINTMENT (OUTPATIENT)
Dept: OTOLARYNGOLOGY | Facility: CLINIC | Age: 81
End: 2026-08-18
Payer: MEDICARE